# Patient Record
Sex: FEMALE | Race: WHITE | Employment: FULL TIME | URBAN - METROPOLITAN AREA
[De-identification: names, ages, dates, MRNs, and addresses within clinical notes are randomized per-mention and may not be internally consistent; named-entity substitution may affect disease eponyms.]

---

## 2021-10-28 ENCOUNTER — HOSPITAL ENCOUNTER (EMERGENCY)
Age: 21
Discharge: HOME | End: 2021-10-28
Attending: EMERGENCY MEDICINE
Payer: COMMERCIAL

## 2021-10-28 VITALS
RESPIRATION RATE: 16 BRPM | TEMPERATURE: 97.7 F | SYSTOLIC BLOOD PRESSURE: 113 MMHG | DIASTOLIC BLOOD PRESSURE: 72 MMHG | WEIGHT: 125 LBS | HEART RATE: 86 BPM | OXYGEN SATURATION: 100 %
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
## 2021-10-28 PROCEDURE — 6370000000 HC RX 637 (ALT 250 FOR IP): Performed by: EMERGENCY MEDICINE

## 2021-10-28 PROCEDURE — 99283 EMERGENCY DEPT VISIT LOW MDM: CPT

## 2021-10-28 RX ORDER — ALBUTEROL SULFATE 90 UG/1
2 AEROSOL, METERED RESPIRATORY (INHALATION) EVERY 6 HOURS PRN
COMMUNITY

## 2021-10-28 RX ORDER — PROPRANOLOL HYDROCHLORIDE 20 MG/1
TABLET ORAL
COMMUNITY
Start: 2021-10-19

## 2021-10-28 RX ORDER — METHOCARBAMOL 750 MG/1
750 TABLET, FILM COATED ORAL 3 TIMES DAILY PRN
COMMUNITY
Start: 2021-09-13

## 2021-10-28 RX ORDER — AMOXICILLIN AND CLAVULANATE POTASSIUM 875; 125 MG/1; MG/1
1 TABLET, FILM COATED ORAL ONCE
Status: COMPLETED | OUTPATIENT
Start: 2021-10-28 | End: 2021-10-28

## 2021-10-28 RX ORDER — MIDODRINE HYDROCHLORIDE 10 MG/1
10 TABLET ORAL 2 TIMES DAILY
COMMUNITY
Start: 2021-06-09

## 2021-10-28 RX ORDER — FLUDROCORTISONE ACETATE 0.1 MG/1
0.1 TABLET ORAL DAILY
COMMUNITY
Start: 2021-06-09

## 2021-10-28 RX ORDER — AMOXICILLIN AND CLAVULANATE POTASSIUM 875; 125 MG/1; MG/1
1 TABLET, FILM COATED ORAL 2 TIMES DAILY
Qty: 20 TABLET | Refills: 0 | Status: SHIPPED | OUTPATIENT
Start: 2021-10-28 | End: 2021-11-07

## 2021-10-28 RX ADMIN — AMOXICILLIN AND CLAVULANATE POTASSIUM 1 TABLET: 875; 125 TABLET, FILM COATED ORAL at 01:09

## 2021-10-28 ASSESSMENT — PAIN DESCRIPTION - PROGRESSION
CLINICAL_PROGRESSION: NOT CHANGED
CLINICAL_PROGRESSION: NOT CHANGED

## 2021-10-28 ASSESSMENT — PAIN DESCRIPTION - ONSET: ONSET: SUDDEN

## 2021-10-28 ASSESSMENT — PAIN DESCRIPTION - PAIN TYPE: TYPE: ACUTE PAIN

## 2021-10-28 ASSESSMENT — PAIN DESCRIPTION - ORIENTATION: ORIENTATION: RIGHT;LEFT

## 2021-10-28 ASSESSMENT — PAIN SCALES - GENERAL
PAINLEVEL_OUTOF10: 8
PAINLEVEL_OUTOF10: 8

## 2021-10-28 ASSESSMENT — PAIN DESCRIPTION - DESCRIPTORS: DESCRIPTORS: ACHING;SHARP

## 2021-10-28 ASSESSMENT — PAIN DESCRIPTION - FREQUENCY: FREQUENCY: CONTINUOUS

## 2021-10-28 ASSESSMENT — PAIN DESCRIPTION - LOCATION: LOCATION: HAND

## 2021-10-28 NOTE — ED TRIAGE NOTES
C/O cat scratches to both hands. Was taking care of a rescued cat. Onset 18:30. States she washes scratches with soap/water, H2O2, and alcohol. Multiple superficial scratches to both hands.

## 2021-10-28 NOTE — ED PROVIDER NOTES
[facility] Emergency Department      Pt Name: Judith Fernando  MRN: [id number]  [place] 2000  Date of evaluation: 10/28/2021  Provider: Jeanne Wallace MD  CHIEF COMPLAINT  Chief Complaint   Patient presents with    Animal Bite     HPI  Judith Fernando is a 22 y.o. female who presents because of a cat bite. She took in a cat that just had a litter of kittens from a neighbor. When she went to pick it up, it hissed, scratched and bit her hands. Her right hand is worse than the other, mostly the thumb. Injury happened prior to arrival.  Last tetanus UTD. REVIEW OF SYSTEMS:  No weakness, no numbness, local pain Pertinent positives and negatives as per the HPI. All other review of systems reviewed and negative. Nursing notes reviewed. PAST MEDICAL HISTORY  History reviewed. No pertinent past medical history. SURGICAL HISTORY  History reviewed. No pertinent surgical history. MEDICATIONS:  No current facility-administered medications on file prior to encounter. Current Outpatient Medications on File Prior to Encounter   Medication Sig Dispense Refill    diclofenac (VOLTAREN) 50 MG EC tablet Take 50 mg by mouth 2 times daily      fludrocortisone (FLORINEF) 0.1 MG tablet Take 0.1 mg by mouth daily      methocarbamol (ROBAXIN) 750 MG tablet Take 750 mg by mouth 3 times daily as needed      midodrine (PROAMATINE) 10 MG tablet Take 10 mg by mouth 2 times daily      propranolol (INDERAL) 20 MG tablet TAKE 1 TABLET BY MOUTH EVERYDAY AT BEDTIME      albuterol sulfate  (90 Base) MCG/ACT inhaler Inhale 2 puffs into the lungs every 6 hours as needed      vitamin D (CHOLECALCIFEROL) 25 MCG (1000 UT) TABS tablet Take 1,000 Units by mouth daily      cyanocobalamin 100 MCG tablet Take 100 mcg by mouth daily      norethindrone (AYGESTIN) 5 MG tablet Take 1 mg by mouth daily       ALLERGIES  Patient has no known allergies.   SOCIAL HISTORY:  Social History     Tobacco Use    Smoking status: Former Smoker    Smokeless tobacco: Never Used   Substance Use Topics    Alcohol use: Never    Drug use: Never     IMMUNIZATIONS:    There is no immunization history on file for this patient. PHYSICAL EXAM  VITAL SIGNS:  Blood pressure 113/72, pulse 86, temperature 97.7 °F (36.5 °C), temperature source Oral, resp. rate 16, weight 125 lb (56.7 kg), SpO2 100 %. Constitutional:  22 y.o. female who does not appear toxic or acutely ill  HENT:  Mucous membranes moist, atraumatic  Neck:  Supple, no signs of injury  Thorax & Lungs:  Respiratory effort normal  Abdomen:  Non distended  Injured Area:  Superficial scratches on both hands, puncture wounds to tip of thumb  Back:  No deformity  Extremities:  No cyanosis    DIAGNOSTIC RESULTS:    RADIOLOGY:  None     ED COURSE:    Medications administered:  Medications   amoxicillin-clavulanate (AUGMENTIN) 875-125 MG per tablet 1 tablet (1 tablet Oral Given 10/28/21 0109)     PROCEDURES:  None     CONSULTATIONS:  None    MEDICAL DECISION MAKING: Venkat Macario is a 22 y.o. female who presented because of cat bite. We will initiate abx. No indication for wound repair. She already cleansed her wounds. Venkat Macario was given appropriate discharge instructions. Referral to follow up provider. New Prescriptions    AMOXICILLIN-CLAVULANATE (AUGMENTIN) 875-125 MG PER TABLET    Take 1 tablet by mouth 2 times daily for 10 days     FOLLOW UP:    [facility]  [address]    Schedule an appointment as soon as possible for a visit       [facility] Emergency Department  [facility] [address]  Go to   If symptoms worsen    FINAL IMPRESSION:    1. Cat bite, initial encounter        (Please note that I used voice recognition software to generate this note.   Occasionally words are mistranscribed despite my efforts to edit errors.)        Everton Watkins MD  10/28/21 [place]